# Patient Record
Sex: FEMALE | Race: WHITE | NOT HISPANIC OR LATINO | URBAN - METROPOLITAN AREA
[De-identification: names, ages, dates, MRNs, and addresses within clinical notes are randomized per-mention and may not be internally consistent; named-entity substitution may affect disease eponyms.]

---

## 2017-10-09 ENCOUNTER — OUTPATIENT (OUTPATIENT)
Dept: OUTPATIENT SERVICES | Facility: HOSPITAL | Age: 72
LOS: 1 days | Discharge: HOME | End: 2017-10-09

## 2017-10-09 DIAGNOSIS — C50.919 MALIGNANT NEOPLASM OF UNSPECIFIED SITE OF UNSPECIFIED FEMALE BREAST: ICD-10-CM

## 2017-10-13 ENCOUNTER — APPOINTMENT (OUTPATIENT)
Dept: HEMATOLOGY ONCOLOGY | Facility: CLINIC | Age: 72
End: 2017-10-13
Payer: MEDICARE

## 2017-10-13 VITALS
HEART RATE: 76 BPM | TEMPERATURE: 98.5 F | DIASTOLIC BLOOD PRESSURE: 89 MMHG | OXYGEN SATURATION: 97 % | WEIGHT: 151.01 LBS | SYSTOLIC BLOOD PRESSURE: 159 MMHG | RESPIRATION RATE: 16 BRPM

## 2017-10-13 PROCEDURE — 99213 OFFICE O/P EST LOW 20 MIN: CPT

## 2017-10-13 RX ORDER — FLUTICASONE FUROATE AND VILANTEROL TRIFENATATE 50; 25 UG/1; UG/1
POWDER RESPIRATORY (INHALATION)
Refills: 0 | Status: ACTIVE | COMMUNITY

## 2017-10-16 DIAGNOSIS — C50.912 MALIGNANT NEOPLASM OF UNSPECIFIED SITE OF LEFT FEMALE BREAST: ICD-10-CM

## 2018-10-11 ENCOUNTER — OUTPATIENT (OUTPATIENT)
Dept: OUTPATIENT SERVICES | Facility: HOSPITAL | Age: 73
LOS: 1 days | Discharge: HOME | End: 2018-10-11

## 2018-10-11 DIAGNOSIS — C50.912 MALIGNANT NEOPLASM OF UNSPECIFIED SITE OF LEFT FEMALE BREAST: ICD-10-CM

## 2018-10-19 ENCOUNTER — APPOINTMENT (OUTPATIENT)
Dept: HEMATOLOGY ONCOLOGY | Facility: CLINIC | Age: 73
End: 2018-10-19
Payer: MEDICARE

## 2018-10-19 VITALS
RESPIRATION RATE: 16 BRPM | HEART RATE: 77 BPM | WEIGHT: 143.3 LBS | SYSTOLIC BLOOD PRESSURE: 108 MMHG | TEMPERATURE: 99 F | OXYGEN SATURATION: 95 % | DIASTOLIC BLOOD PRESSURE: 72 MMHG

## 2018-10-19 PROCEDURE — 99213 OFFICE O/P EST LOW 20 MIN: CPT

## 2018-10-19 RX ORDER — LOSARTAN POTASSIUM 25 MG/1
25 TABLET, FILM COATED ORAL
Refills: 0 | Status: ACTIVE | COMMUNITY

## 2018-10-19 RX ORDER — FUROSEMIDE 20 MG/1
20 TABLET ORAL
Refills: 0 | Status: ACTIVE | COMMUNITY

## 2019-05-31 ENCOUNTER — MESSAGE (OUTPATIENT)
Age: 74
End: 2019-05-31

## 2019-10-18 ENCOUNTER — OUTPATIENT (OUTPATIENT)
Dept: OUTPATIENT SERVICES | Facility: HOSPITAL | Age: 74
LOS: 1 days | Discharge: HOME | End: 2019-10-18

## 2019-10-18 DIAGNOSIS — C50.912 MALIGNANT NEOPLASM OF UNSPECIFIED SITE OF LEFT FEMALE BREAST: ICD-10-CM

## 2019-10-25 ENCOUNTER — APPOINTMENT (OUTPATIENT)
Dept: HEMATOLOGY ONCOLOGY | Facility: CLINIC | Age: 74
End: 2019-10-25
Payer: MEDICARE

## 2019-10-25 VITALS
OXYGEN SATURATION: 95 % | HEART RATE: 80 BPM | TEMPERATURE: 98.5 F | DIASTOLIC BLOOD PRESSURE: 73 MMHG | RESPIRATION RATE: 18 BRPM | WEIGHT: 150.11 LBS | SYSTOLIC BLOOD PRESSURE: 129 MMHG

## 2019-10-25 DIAGNOSIS — D12.6 BENIGN NEOPLASM OF COLON, UNSPECIFIED: ICD-10-CM

## 2019-10-25 DIAGNOSIS — Z17.0 MALIGNANT NEOPLASM OF UNSPECIFIED SITE OF LEFT FEMALE BREAST: ICD-10-CM

## 2019-10-25 DIAGNOSIS — C50.912 MALIGNANT NEOPLASM OF UNSPECIFIED SITE OF LEFT FEMALE BREAST: ICD-10-CM

## 2019-10-25 DIAGNOSIS — M85.80 OTHER SPECIFIED DISORDERS OF BONE DENSITY AND STRUCTURE, UNSPECIFIED SITE: ICD-10-CM

## 2019-10-25 PROCEDURE — 99213 OFFICE O/P EST LOW 20 MIN: CPT

## 2019-10-25 RX ORDER — AMLODIPINE BESYLATE 2.5 MG/1
2.5 TABLET ORAL
Refills: 0 | Status: DISCONTINUED | COMMUNITY
End: 2019-10-25

## 2019-10-25 NOTE — REVIEW OF SYSTEMS
[Anxiety] : anxiety [Depression] : depression [Negative] : Allergic/Immunologic [FreeTextEntry2] : Energy level isnormal. Will have flu vaccination with primary care MD in 11/2019.  Had Shingrix , Prevnar 13 and Pneumovax. [FreeTextEntry3] : Last eye exam with benign finding. [FreeTextEntry6] : Asthma improved with sublingual desensitization [FreeTextEntry7] : Most recent colonoscopy 1/6/2018, 3 tubular adenomas found.\par Had colonoscopy 01/08/2018, no polyp found.  See interval history. Appetite is good. [FreeTextEntry8] : Last GYN exam 01/2016, benign findings. Denies vaginal spotting/bleeding. [de-identified] : Anxiety and depression  controlled with Lexapro  [FreeTextEntry9] : H/O Osteoporosis  and Arthritis. Last BMD 05/21/2019  demonstrated osteopenia. See interval history [de-identified] : Sees dermatologist twice yearly.

## 2019-10-25 NOTE — PHYSICAL EXAM
[Fully active, able to carry on all pre-disease performance without restriction] : Status 0 - Fully active, able to carry on all pre-disease performance without restriction [Normal] : affect appropriate [de-identified] : Right breast: no mass. Left mastectomy with flap reconstruction, no chest wall mass. [de-identified] : Ecchymoses bith legs

## 2019-10-25 NOTE — ASSESSMENT
[With Patient/Caregiver] : : With Patient/Caregiver [Designated Health Care Proxy] : Designated Health Care Proxy [Name: ___] : Name: [unfilled] [Relationship: ___] : Relationship: [unfilled] [FreeTextEntry2] : Patient also has MOLST form.\par She is living in [place] and will be having future medical treatment there.\par The patient will fax a copy of her health care proxy to this office. [AdvancecareDate] : 10/25/2019

## 2019-10-25 NOTE — HISTORY OF PRESENT ILLNESS
[Disease: _____________________] : Disease: [unfilled] [T: ___] : T[unfilled] [N: ___] : N[unfilled] [M: ___] : M[unfilled] [AJCC Stage: ____] : AJCC Stage: [unfilled] [Treatment Protocol] : Treatment Protocol [de-identified] : The patient presented in 2001, at age 55, with an abnormal screening mammogram of the left breast.\par \par Ultrasound-guided core biopsy of a 4 mm nodule was positive for infiltrating ductal carcinoma. A second nodule was suspicious for DCIS.\par \par The patient underwent left mastectomy and sentinel lymph node biopsy followed by free flap reconstruction on April 11, 2001. Pathology demonstrated a 0.7 cm moderately differentiated infiltrating ductal carcinoma which was ER positive (40%), IN positive (90%) and HER-2/fausto negative. He would 2 negative sentinel lymph nodes. A non-sentinel lymph node was also negative.\par \par Postoperatively the patient took tamoxifen from July 2001 through January 2005. She then initiated treatment with aromatase inhibitors in January 2005 through February 2012. There was a  hiatus in treatment from September 2005 until January 2007.\par \par [facility] genetic panel was negative for deleterious mutations. [de-identified] : Infiltrating ductal carcinoma ER positive PA positive HER-2/fausto negative [FreeTextEntry1] : On observation. [de-identified] : The patient has been 18  years 7 months since breast cancer diagnosis.\par \par The  patient completed  3 years 6  months of  Tamoxifen 14  years 7 months ago. \par \par The patient was switched to Aromasin  and later Femara. The patient completed  5 years of  Femara 7  years 7 months ago.\par \par 08/21/2019 - BILATERAL BREAST MRI W WO CONTRAST - No MRI evidence of malignancy, BI RADS MRI CATEGORY 2, BENIGN FINDINGS.\par \par Last saw  breast surgeon Dr Ayala 10/02/2018, has appointment for 11/5/2019.\par \par Has appointment with  saw PCP Dr Jax Gonzalez her yearly annual physical 11/5/2019.\par Newly diagnosed sleep apnea, last saw pulmonologist 07/2018. Advised to f/u in one year.\par \par Last BMD 05/21/2019  demonstrated osteopenia in spine. She will discuss again with her PCP.\par \par No other interval events since last seen.\par \par \par \par